# Patient Record
Sex: FEMALE | Race: BLACK OR AFRICAN AMERICAN | NOT HISPANIC OR LATINO | URBAN - METROPOLITAN AREA
[De-identification: names, ages, dates, MRNs, and addresses within clinical notes are randomized per-mention and may not be internally consistent; named-entity substitution may affect disease eponyms.]

---

## 2022-10-26 ENCOUNTER — EMERGENCY (EMERGENCY)
Facility: HOSPITAL | Age: 47
LOS: 1 days | Discharge: HOME | End: 2022-10-26
Attending: STUDENT IN AN ORGANIZED HEALTH CARE EDUCATION/TRAINING PROGRAM | Admitting: STUDENT IN AN ORGANIZED HEALTH CARE EDUCATION/TRAINING PROGRAM
Payer: COMMERCIAL

## 2022-10-26 VITALS
RESPIRATION RATE: 18 BRPM | HEIGHT: 61 IN | HEART RATE: 81 BPM | SYSTOLIC BLOOD PRESSURE: 111 MMHG | OXYGEN SATURATION: 99 % | DIASTOLIC BLOOD PRESSURE: 75 MMHG | WEIGHT: 136.91 LBS

## 2022-10-26 VITALS
TEMPERATURE: 98 F | DIASTOLIC BLOOD PRESSURE: 80 MMHG | SYSTOLIC BLOOD PRESSURE: 126 MMHG | RESPIRATION RATE: 17 BRPM | HEART RATE: 83 BPM | OXYGEN SATURATION: 99 %

## 2022-10-26 LAB
ALBUMIN SERPL ELPH-MCNC: 4.9 G/DL — SIGNIFICANT CHANGE UP (ref 3.3–5)
ALP SERPL-CCNC: 74 U/L — SIGNIFICANT CHANGE UP (ref 40–120)
ALT FLD-CCNC: 16 U/L — SIGNIFICANT CHANGE UP (ref 10–45)
ANION GAP SERPL CALC-SCNC: 14 MMOL/L — SIGNIFICANT CHANGE UP (ref 5–17)
APPEARANCE UR: CLEAR — SIGNIFICANT CHANGE UP
AST SERPL-CCNC: 22 U/L — SIGNIFICANT CHANGE UP (ref 10–40)
BACTERIA # UR AUTO: PRESENT /HPF
BASOPHILS # BLD AUTO: 0.05 K/UL — SIGNIFICANT CHANGE UP (ref 0–0.2)
BASOPHILS NFR BLD AUTO: 0.3 % — SIGNIFICANT CHANGE UP (ref 0–2)
BILIRUB SERPL-MCNC: 0.6 MG/DL — SIGNIFICANT CHANGE UP (ref 0.2–1.2)
BILIRUB UR-MCNC: NEGATIVE — SIGNIFICANT CHANGE UP
BUN SERPL-MCNC: 20 MG/DL — SIGNIFICANT CHANGE UP (ref 7–23)
CALCIUM SERPL-MCNC: 10.7 MG/DL — HIGH (ref 8.4–10.5)
CHLORIDE SERPL-SCNC: 104 MMOL/L — SIGNIFICANT CHANGE UP (ref 96–108)
CO2 SERPL-SCNC: 25 MMOL/L — SIGNIFICANT CHANGE UP (ref 22–31)
COD CRY URNS QL: ABNORMAL /HPF
COLOR SPEC: YELLOW — SIGNIFICANT CHANGE UP
CREAT SERPL-MCNC: 1.17 MG/DL — SIGNIFICANT CHANGE UP (ref 0.5–1.3)
DIFF PNL FLD: NEGATIVE — SIGNIFICANT CHANGE UP
EGFR: 58 ML/MIN/1.73M2 — LOW
EOSINOPHIL # BLD AUTO: 0.06 K/UL — SIGNIFICANT CHANGE UP (ref 0–0.5)
EOSINOPHIL NFR BLD AUTO: 0.4 % — SIGNIFICANT CHANGE UP (ref 0–6)
EPI CELLS # UR: ABNORMAL /HPF (ref 0–5)
GLUCOSE SERPL-MCNC: 107 MG/DL — HIGH (ref 70–99)
GLUCOSE UR QL: NEGATIVE — SIGNIFICANT CHANGE UP
HCG SERPL-ACNC: 2 MIU/ML — SIGNIFICANT CHANGE UP
HCT VFR BLD CALC: 42.8 % — SIGNIFICANT CHANGE UP (ref 34.5–45)
HGB BLD-MCNC: 13.6 G/DL — SIGNIFICANT CHANGE UP (ref 11.5–15.5)
HYALINE CASTS # UR AUTO: SIGNIFICANT CHANGE UP /LPF (ref 0–2)
IMM GRANULOCYTES NFR BLD AUTO: 0.6 % — SIGNIFICANT CHANGE UP (ref 0–0.9)
KETONES UR-MCNC: NEGATIVE — SIGNIFICANT CHANGE UP
LEUKOCYTE ESTERASE UR-ACNC: NEGATIVE — SIGNIFICANT CHANGE UP
LIDOCAIN IGE QN: 41 U/L — SIGNIFICANT CHANGE UP (ref 7–60)
LYMPHOCYTES # BLD AUTO: 1.64 K/UL — SIGNIFICANT CHANGE UP (ref 1–3.3)
LYMPHOCYTES # BLD AUTO: 10.6 % — LOW (ref 13–44)
MCHC RBC-ENTMCNC: 26.4 PG — LOW (ref 27–34)
MCHC RBC-ENTMCNC: 31.8 GM/DL — LOW (ref 32–36)
MCV RBC AUTO: 82.9 FL — SIGNIFICANT CHANGE UP (ref 80–100)
MONOCYTES # BLD AUTO: 0.75 K/UL — SIGNIFICANT CHANGE UP (ref 0–0.9)
MONOCYTES NFR BLD AUTO: 4.8 % — SIGNIFICANT CHANGE UP (ref 2–14)
NEUTROPHILS # BLD AUTO: 12.87 K/UL — HIGH (ref 1.8–7.4)
NEUTROPHILS NFR BLD AUTO: 83.3 % — HIGH (ref 43–77)
NITRITE UR-MCNC: POSITIVE
NRBC # BLD: 0 /100 WBCS — SIGNIFICANT CHANGE UP (ref 0–0)
PH UR: 7 — SIGNIFICANT CHANGE UP (ref 5–8)
PLATELET # BLD AUTO: 330 K/UL — SIGNIFICANT CHANGE UP (ref 150–400)
POTASSIUM SERPL-MCNC: 4 MMOL/L — SIGNIFICANT CHANGE UP (ref 3.5–5.3)
POTASSIUM SERPL-SCNC: 4 MMOL/L — SIGNIFICANT CHANGE UP (ref 3.5–5.3)
PROT SERPL-MCNC: 8.7 G/DL — HIGH (ref 6–8.3)
PROT UR-MCNC: NEGATIVE MG/DL — SIGNIFICANT CHANGE UP
RBC # BLD: 5.16 M/UL — SIGNIFICANT CHANGE UP (ref 3.8–5.2)
RBC # FLD: 12.9 % — SIGNIFICANT CHANGE UP (ref 10.3–14.5)
RBC CASTS # UR COMP ASSIST: < 5 /HPF — SIGNIFICANT CHANGE UP
SARS-COV-2 RNA SPEC QL NAA+PROBE: NEGATIVE — SIGNIFICANT CHANGE UP
SODIUM SERPL-SCNC: 143 MMOL/L — SIGNIFICANT CHANGE UP (ref 135–145)
SP GR SPEC: 1.01 — SIGNIFICANT CHANGE UP (ref 1–1.03)
UROBILINOGEN FLD QL: 0.2 E.U./DL — SIGNIFICANT CHANGE UP
WBC # BLD: 15.47 K/UL — HIGH (ref 3.8–10.5)
WBC # FLD AUTO: 15.47 K/UL — HIGH (ref 3.8–10.5)
WBC UR QL: < 5 /HPF — SIGNIFICANT CHANGE UP

## 2022-10-26 PROCEDURE — 76856 US EXAM PELVIC COMPLETE: CPT

## 2022-10-26 PROCEDURE — 80053 COMPREHEN METABOLIC PANEL: CPT

## 2022-10-26 PROCEDURE — 36415 COLL VENOUS BLD VENIPUNCTURE: CPT

## 2022-10-26 PROCEDURE — 74177 CT ABD & PELVIS W/CONTRAST: CPT | Mod: 26,MG

## 2022-10-26 PROCEDURE — 76856 US EXAM PELVIC COMPLETE: CPT | Mod: 26

## 2022-10-26 PROCEDURE — 96365 THER/PROPH/DIAG IV INF INIT: CPT

## 2022-10-26 PROCEDURE — 74177 CT ABD & PELVIS W/CONTRAST: CPT | Mod: MG

## 2022-10-26 PROCEDURE — G1004: CPT

## 2022-10-26 PROCEDURE — 99284 EMERGENCY DEPT VISIT MOD MDM: CPT | Mod: 25

## 2022-10-26 PROCEDURE — 87635 SARS-COV-2 COVID-19 AMP PRB: CPT

## 2022-10-26 PROCEDURE — 85025 COMPLETE CBC W/AUTO DIFF WBC: CPT

## 2022-10-26 PROCEDURE — 99285 EMERGENCY DEPT VISIT HI MDM: CPT

## 2022-10-26 PROCEDURE — 84702 CHORIONIC GONADOTROPIN TEST: CPT

## 2022-10-26 PROCEDURE — 83690 ASSAY OF LIPASE: CPT

## 2022-10-26 PROCEDURE — 76830 TRANSVAGINAL US NON-OB: CPT | Mod: 26

## 2022-10-26 PROCEDURE — 81001 URINALYSIS AUTO W/SCOPE: CPT

## 2022-10-26 PROCEDURE — 76830 TRANSVAGINAL US NON-OB: CPT

## 2022-10-26 RX ORDER — METOCLOPRAMIDE HCL 10 MG
10 TABLET ORAL ONCE
Refills: 0 | Status: DISCONTINUED | OUTPATIENT
Start: 2022-10-26 | End: 2022-10-26

## 2022-10-26 RX ORDER — SODIUM CHLORIDE 9 MG/ML
1000 INJECTION INTRAMUSCULAR; INTRAVENOUS; SUBCUTANEOUS ONCE
Refills: 0 | Status: COMPLETED | OUTPATIENT
Start: 2022-10-26 | End: 2022-10-26

## 2022-10-26 RX ORDER — CEFPODOXIME PROXETIL 100 MG
1 TABLET ORAL
Qty: 14 | Refills: 0
Start: 2022-10-26 | End: 2022-11-01

## 2022-10-26 RX ORDER — DIATRIZOATE MEGLUMINE 180 MG/ML
30 INJECTION, SOLUTION INTRAVESICAL ONCE
Refills: 0 | Status: COMPLETED | OUTPATIENT
Start: 2022-10-26 | End: 2022-10-26

## 2022-10-26 RX ORDER — METOCLOPRAMIDE HCL 10 MG
10 TABLET ORAL ONCE
Refills: 0 | Status: COMPLETED | OUTPATIENT
Start: 2022-10-26 | End: 2022-10-26

## 2022-10-26 RX ADMIN — DIATRIZOATE MEGLUMINE 30 MILLILITER(S): 180 INJECTION, SOLUTION INTRAVESICAL at 12:53

## 2022-10-26 RX ADMIN — SODIUM CHLORIDE 1000 MILLILITER(S): 9 INJECTION INTRAMUSCULAR; INTRAVENOUS; SUBCUTANEOUS at 12:53

## 2022-10-26 RX ADMIN — Medication 104 MILLIGRAM(S): at 13:21

## 2022-10-26 RX ADMIN — Medication 10 MILLIGRAM(S): at 14:00

## 2022-10-26 RX ADMIN — SODIUM CHLORIDE 1000 MILLILITER(S): 9 INJECTION INTRAMUSCULAR; INTRAVENOUS; SUBCUTANEOUS at 14:00

## 2022-10-26 NOTE — ED PROVIDER NOTE - PHYSICAL EXAMINATION
VITAL SIGNS: I have reviewed nursing notes and confirm.  CONSTITUTIONAL: Well-developed; in no acute distress.   SKIN:  warm and dry, no acute rash.   HEAD:  normocephalic, atraumatic.  EYES: PERRL, EOM intact; conjunctiva and sclera clear.  ENT: No nasal discharge; airway clear.   NECK: Supple; non tender.  CARD: S1, S2 normal; no murmurs, gallops, or rubs. Regular rate and rhythm.   RESP:  Clear to auscultation b/l, no wheezes, rales or rhonchi.  ABD: Normal bowel sounds; soft; non-distended; ttp epigastric region; no guarding/ rebound. No CVA tenderness.   EXT: Normal ROM. No clubbing, cyanosis or edema. 2+ pulses to b/l ue/le.  NEURO: Alert, oriented, grossly unremarkable  PSYCH: Cooperative, mood and affect appropriate.

## 2022-10-26 NOTE — ED PROVIDER NOTE - PROGRESS NOTE DETAILS
Hari - Reassessed after CT. She reports nausea has improved, still having mild pain, localizes the most pain to RLQ/R pelvis. She has 5cm ovarian cyst on CT which she was aware of. Dr. Christy performed pelvic exam, pt has R adnexal ttp. She is s/p hysterectomy. Will obtain US pelvis to r/o ovarian pathology. prelim us with cyst/possible hydroxalpinx. pt requesting dc. doesn't want to wait for final read. understands may change. offered gyn eval in ED but also declined. reports she has gyn in [place] and will call in am for followup. return precautions discussed

## 2022-10-26 NOTE — ED PROVIDER NOTE - NS ED ATTENDING STATEMENT MOD
I have seen and examined this patient and fully participated in the care of this patient as the teaching attending.  The service was shared with the NAYAN.  I reviewed and verified the documentation and independently performed the documented:

## 2022-10-26 NOTE — ED PROVIDER NOTE - NS ED ROS FT
Constitutional: No fever. No chills.  Eyes: No redness. No discharge. No vision change.   ENT: No sore throat. No ear pain.  Cardiovascular: No chest pain. No leg swelling.  Respiratory: No cough. No shortness of breath.  GI: +abdominal pain. +vomiting. +diarrhea.   MSK: No joint pain. No back pain.   Skin: No rash. No abrasions.   Neuro: No numbness. No weakness.   Psych: No known mental health issues.

## 2022-10-26 NOTE — ED PROVIDER NOTE - OBJECTIVE STATEMENT
46yo female with no reported pmhx and no prior abdominal surgeries presents with abdominal pain, N/V/D since this morning. Pt reports acute onset of symptoms this morning. She localizes pain to the epigastric region radiating to the back. She states she has been unable to tolerate po at home. Associated with multiple episodes of non-bloody diarrhea. She denies fever, chills, chest pain, shortness of breath, urinary symptoms, recent travel or recent antibiotics.

## 2022-10-26 NOTE — ED PROVIDER NOTE - CLINICAL SUMMARY MEDICAL DECISION MAKING FREE TEXT BOX
Afebrile and hemodynamically stable. Given zofran 8mg IV en route by EMS, given additional reglan 10mg IV while in ED with improvement of nausea/vomiting. Also given 1L NS bolus. CBC with 15k. No metabolic abnormalities. Lipase wnl. UA nitrite pos w/ calcium oxalate crystals, no wbcs or leuk esterase. HCG neg. CTAP pending. Afebrile and hemodynamically stable. Given zofran 8mg IV en route by EMS, given additional reglan 10mg IV while in ED with improvement of nausea/vomiting. Also given 1L NS bolus. CBC with 15k. No metabolic abnormalities. Lipase wnl. UA nitrite pos w/ calcium oxalate crystals, no wbcs or leuk esterase. HCG neg. CTAP without acute abnormality aside from 5cm right ovarian cyst which pt was previously aware of. On reexamination, she reports more tenderness in RLQ/R pelvic region. Dr. Christy performed pelvic exam with +right adnexal tenderness. US pelvis obtained for further evaluation. Discussed with radiology resident, she has a simple right ovarian cyst with ?hydrosalpinx. Final read pending. Pt reassessed and she reports pain and nausea have resolved. She continues to have some loose stool although this has also improved. Will treat nitrite pos urine, pending urine cx. Discussed all results with pt. Differential includes pain from ovarian cyst vs. passed renal stone. Pt will f/u with gyn. Strict return precautions given. Afebrile and hemodynamically stable. Given zofran 8mg IV en route by EMS, given additional reglan 10mg IV while in ED with improvement of nausea/vomiting. Also given 1L NS bolus. CBC with 15k. No metabolic abnormalities. Lipase wnl. UA nitrite pos w/ calcium oxalate crystals, no wbcs or leuk esterase. HCG neg. CTAP without acute abnormality aside from 5cm right ovarian cyst which pt was previously aware of. On reexamination, she reports more tenderness in RLQ/R pelvic region. Dr. Christy performed pelvic exam with +right adnexal tenderness. US pelvis obtained for further evaluation. Discussed with radiology resident, she has a simple right ovarian cyst with ?hydrosalpinx. Final read pending. Pt reassessed and she reports pain and nausea have resolved. She continues to have some loose stool although this has also improved. Will treat nitrite pos urine, pending urine cx. Discussed all results with pt. Differential includes pain from ovarian cyst vs. passed renal stone. Low suspicion for intermittent torsion. Pt will f/u with gyn. Strict return precautions given.

## 2022-10-26 NOTE — ED ADULT TRIAGE NOTE - CHIEF COMPLAINT QUOTE
Pt reports n/v/d starting today with abd pain. Given 8mg zofran by EMS. 20G IV  placed to right hand by EMS. .

## 2022-10-26 NOTE — ED PROVIDER NOTE - NSFOLLOWUPINSTRUCTIONS_ED_ALL_ED_FT
Take one tab of cefpodoxime twice daily for 7 days.    Drink plenty of clear fluids. Your urine should be a pale yellow.    Start the BRAT diet for diarrhea - bananas, rice, applesauce, and toast.    Follow up with your gynecologist and primary care doctor. Please bring copies of your results.     Return to the Emergency Department if you develop fever>100.4F, worsening abdominal pain, inability to eat or drink due to vomiting, blood in stool, or any other concerns.      Urinary Tract Infection, Adult       A urinary tract infection (UTI) is an infection of any part of the urinary tract. The urinary tract includes:  •The kidneys.      •The ureters.      •The bladder.      •The urethra.      These organs make, store, and get rid of pee (urine) in the body.      What are the causes?    This infection is caused by germs (bacteria) in your genital area. These germs grow and cause swelling (inflammation) of your urinary tract.      What increases the risk?    The following factors may make you more likely to develop this condition:  •Using a small, thin tube (catheter) to drain pee.      •Not being able to control when you pee or poop (incontinence).    •Being female. If you are female, these things can increase the risk:•Using these methods to prevent pregnancy:  •A medicine that kills sperm (spermicide).      •A device that blocks sperm (diaphragm).        •Having low levels of a female hormone (estrogen).      •Being pregnant.        You are more likely to develop this condition if:  •You have genes that add to your risk.      •You are sexually active.      •You take antibiotic medicines.     •You have trouble peeing because of:  •A prostate that is bigger than normal, if you are male.      •A blockage in the part of your body that drains pee from the bladder.      •A kidney stone.       •A nerve condition that affects your bladder.      •Not getting enough to drink.       •Not peeing often enough.      •You have other conditions, such as:  •Diabetes.       •A weak disease-fighting system (immune system).      •Sickle cell disease.       •Gout.       •Injury of the spine.          What are the signs or symptoms?    Symptoms of this condition include:  •Needing to pee right away.      •Peeing small amounts often.      •Pain or burning when peeing.      •Blood in the pee.      •Pee that smells bad or not like normal.      •Trouble peeing.      •Pee that is cloudy.      •Fluid coming from the vagina, if you are female.      •Pain in the belly or lower back.      Other symptoms include:  •Vomiting.      •Not feeling hungry.      •Feeling mixed up (confused). This may be the first symptom in older adults.      •Being tired and grouchy (irritable).      •A fever.      •Watery poop (diarrhea).        How is this treated?    •Taking antibiotic medicine.      •Taking other medicines.      •Drinking enough water.      In some cases, you may need to see a specialist.      Follow these instructions at home:     Medicines     •Take over-the-counter and prescription medicines only as told by your doctor.      •If you were prescribed an antibiotic medicine, take it as told by your doctor. Do not stop taking it even if you start to feel better.      General instructions   •Make sure you:  •Pee until your bladder is empty.       •Do not hold pee for a long time.      •Empty your bladder after sex.      •Wipe from front to back after peeing or pooping if you are a female. Use each tissue one time when you wipe.        •Drink enough fluid to keep your pee pale yellow.      •Keep all follow-up visits.        Contact a doctor if:    •You do not get better after 1–2 days.      •Your symptoms go away and then come back.        Get help right away if:    •You have very bad back pain.      •You have very bad pain in your lower belly.      •You have a fever.      •You have chills.      •You feeling like you will vomit or you vomit.        Summary    •A urinary tract infection (UTI) is an infection of any part of the urinary tract.      •This condition is caused by germs in your genital area.      •There are many risk factors for a UTI.      •Treatment includes antibiotic medicines.      •Drink enough fluid to keep your pee pale yellow.      This information is not intended to replace advice given to you by your health care provider. Make sure you discuss any questions you have with your health care provider. Call your gyn doctor tomorrow morning for followup of your possible ovarian cyst/ hydrosalpinx. Take one tab of cefpodoxime twice daily for 7 days for uti.    Drink plenty of clear fluids. Your urine should be a pale yellow.    Start the BRAT diet for diarrhea - bananas, rice, applesauce, and toast.    Follow up with your gynecologist and primary care doctor. Please bring copies of your results.     Return to the Emergency Department if you develop fever>100.4F, worsening abdominal pain, inability to eat or drink due to vomiting, blood in stool, or any other concerns.      Urinary Tract Infection, Adult       A urinary tract infection (UTI) is an infection of any part of the urinary tract. The urinary tract includes:  •The kidneys.      •The ureters.      •The bladder.      •The urethra.      These organs make, store, and get rid of pee (urine) in the body.      What are the causes?    This infection is caused by germs (bacteria) in your genital area. These germs grow and cause swelling (inflammation) of your urinary tract.      What increases the risk?    The following factors may make you more likely to develop this condition:  •Using a small, thin tube (catheter) to drain pee.      •Not being able to control when you pee or poop (incontinence).    •Being female. If you are female, these things can increase the risk:•Using these methods to prevent pregnancy:  •A medicine that kills sperm (spermicide).      •A device that blocks sperm (diaphragm).        •Having low levels of a female hormone (estrogen).      •Being pregnant.        You are more likely to develop this condition if:  •You have genes that add to your risk.      •You are sexually active.      •You take antibiotic medicines.     •You have trouble peeing because of:  •A prostate that is bigger than normal, if you are male.      •A blockage in the part of your body that drains pee from the bladder.      •A kidney stone.       •A nerve condition that affects your bladder.      •Not getting enough to drink.       •Not peeing often enough.      •You have other conditions, such as:  •Diabetes.       •A weak disease-fighting system (immune system).      •Sickle cell disease.       •Gout.       •Injury of the spine.          What are the signs or symptoms?    Symptoms of this condition include:  •Needing to pee right away.      •Peeing small amounts often.      •Pain or burning when peeing.      •Blood in the pee.      •Pee that smells bad or not like normal.      •Trouble peeing.      •Pee that is cloudy.      •Fluid coming from the vagina, if you are female.      •Pain in the belly or lower back.      Other symptoms include:  •Vomiting.      •Not feeling hungry.      •Feeling mixed up (confused). This may be the first symptom in older adults.      •Being tired and grouchy (irritable).      •A fever.      •Watery poop (diarrhea).        How is this treated?    •Taking antibiotic medicine.      •Taking other medicines.      •Drinking enough water.      In some cases, you may need to see a specialist.      Follow these instructions at home:     Medicines     •Take over-the-counter and prescription medicines only as told by your doctor.      •If you were prescribed an antibiotic medicine, take it as told by your doctor. Do not stop taking it even if you start to feel better.      General instructions   •Make sure you:  •Pee until your bladder is empty.       •Do not hold pee for a long time.      •Empty your bladder after sex.      •Wipe from front to back after peeing or pooping if you are a female. Use each tissue one time when you wipe.        •Drink enough fluid to keep your pee pale yellow.      •Keep all follow-up visits.        Contact a doctor if:    •You do not get better after 1–2 days.      •Your symptoms go away and then come back.        Get help right away if:    •You have very bad back pain.      •You have very bad pain in your lower belly.      •You have a fever.      •You have chills.      •You feeling like you will vomit or you vomit.        Summary    •A urinary tract infection (UTI) is an infection of any part of the urinary tract.      •This condition is caused by germs in your genital area.      •There are many risk factors for a UTI.      •Treatment includes antibiotic medicines.      •Drink enough fluid to keep your pee pale yellow.      This information is not intended to replace advice given to you by your health care provider. Make sure you discuss any questions you have with your health care provider.

## 2022-10-26 NOTE — ED ADULT NURSE NOTE - NS ED NURSE LEVEL OF CONSCIOUSNESS ORIENTATION
[facility]    Minerva Ojeda MD  Dermatology  [facility]  [address] [place] [address]  [phone]    Neena Phillip NP  Nurse Practitioner  Dermatology  [facility] Provider   [address] [address]  [phone]       Ana Newsome MD  Dermatology  [facility] Provider   [address] [address]  [phone]     Cristi Mcmanus MD  [facility] [address]  [address] [place] [address]  [phone]    Christiano Villar MD  Dermatology  [facility] Provider   [facility]  [address]   [address]   [place] [address] [phone]      Oriented - self; Oriented - place; Oriented - time

## 2022-10-26 NOTE — ED PROVIDER NOTE - ATTENDING CONTRIBUTION TO CARE
48yo female with pmh of R ovarian cyst and PSH of hysterectomy presents with abdominal pain, N/V/D since this morning. On exam, VS wnl, +RUQ and epigastric ttp no rebound/guarding abdomen soft non-distended. negative Babcock's sign.labs show wbc 15, hcg neg, CTAP shows large R ovarian cyst, otherwise normal CT. given  given large R ovarian cyst pelvic exam performed revealing: (chaperone RN), +R adnexal ttp, no cervical or L adnexal ttp. tvus ordered to assess for torsion.     UA also positive for nitrites. if tvus negative for torsion will dc with abx for uti and obgyn followup for ovarian cyst.

## 2022-10-26 NOTE — ED ADULT NURSE NOTE - OBJECTIVE STATEMENT
Pt is a 48 y/o female A&Ox4 in NAD ambulatory with steady gait c/o intermittent episodes of N/V/D, diaphoresis x days. Pt endorses epigastric discomfort. Pt denies fever/chills. Pt talking in clear, full sentences, respirations even and unlabored.

## 2022-10-26 NOTE — ED PROVIDER NOTE - PATIENT PORTAL LINK FT
You can access the FollowMyHealth Patient Portal offered by [facility] by registering at the following website: [email or web address]. By joining [facility]’s FollowMyHealth portal, you will also be able to view your health information using other applications (apps) compatible with our system.

## 2022-10-26 NOTE — ED PROVIDER NOTE - CARE PLAN
1 Principal Discharge DX:	Abdominal pain   Principal Discharge DX:	Abdominal pain  Secondary Diagnosis:	Hydrosalpinx

## 2022-10-28 DIAGNOSIS — Z90.710 ACQUIRED ABSENCE OF BOTH CERVIX AND UTERUS: ICD-10-CM

## 2022-10-28 DIAGNOSIS — R10.13 EPIGASTRIC PAIN: ICD-10-CM

## 2022-10-28 DIAGNOSIS — R10.31 RIGHT LOWER QUADRANT PAIN: ICD-10-CM

## 2022-10-28 DIAGNOSIS — Z20.822 CONTACT WITH AND (SUSPECTED) EXPOSURE TO COVID-19: ICD-10-CM

## 2022-10-28 DIAGNOSIS — R19.7 DIARRHEA, UNSPECIFIED: ICD-10-CM

## 2022-10-28 DIAGNOSIS — R11.2 NAUSEA WITH VOMITING, UNSPECIFIED: ICD-10-CM

## 2022-10-28 DIAGNOSIS — N70.11 CHRONIC SALPINGITIS: ICD-10-CM

## 2022-10-28 DIAGNOSIS — E10.11 TYPE 1 DIABETES MELLITUS WITH KETOACIDOSIS WITH COMA: ICD-10-CM

## 2022-10-28 DIAGNOSIS — R10.11 RIGHT UPPER QUADRANT PAIN: ICD-10-CM

## 2022-10-28 DIAGNOSIS — Z88.2 ALLERGY STATUS TO SULFONAMIDES: ICD-10-CM

## 2023-04-10 PROBLEM — Z00.00 ENCOUNTER FOR PREVENTIVE HEALTH EXAMINATION: Status: ACTIVE | Noted: 2023-04-10

## 2023-04-10 PROBLEM — Z78.9 OTHER SPECIFIED HEALTH STATUS: Chronic | Status: ACTIVE | Noted: 2022-10-26

## 2023-04-24 ENCOUNTER — APPOINTMENT (OUTPATIENT)
Dept: OTOLARYNGOLOGY | Facility: CLINIC | Age: 48
End: 2023-04-24
Payer: MEDICAID

## 2023-04-24 DIAGNOSIS — D37.030 NEOPLASM OF UNCERTAIN BEHAVIOR OF THE PAROTID SALIVARY GLANDS: ICD-10-CM

## 2023-04-24 PROCEDURE — 31575 DIAGNOSTIC LARYNGOSCOPY: CPT

## 2023-04-24 PROCEDURE — 99204 OFFICE O/P NEW MOD 45 MIN: CPT | Mod: 25

## 2023-05-22 ENCOUNTER — OUTPATIENT (OUTPATIENT)
Dept: OUTPATIENT SERVICES | Facility: HOSPITAL | Age: 48
LOS: 1 days | End: 2023-05-22
Payer: COMMERCIAL

## 2023-05-22 ENCOUNTER — RESULT REVIEW (OUTPATIENT)
Age: 48
End: 2023-05-22

## 2023-05-22 ENCOUNTER — APPOINTMENT (OUTPATIENT)
Dept: CT IMAGING | Facility: HOSPITAL | Age: 48
End: 2023-05-22
Payer: MEDICAID

## 2023-05-22 LAB — POCT ISTAT CREATININE: 0.6 MG/DL — SIGNIFICANT CHANGE UP (ref 0.5–1.3)

## 2023-05-22 PROCEDURE — 70491 CT SOFT TISSUE NECK W/DYE: CPT

## 2023-05-22 PROCEDURE — 70491 CT SOFT TISSUE NECK W/DYE: CPT | Mod: 26

## 2023-05-22 PROCEDURE — 82565 ASSAY OF CREATININE: CPT
